# Patient Record
Sex: FEMALE | Race: BLACK OR AFRICAN AMERICAN | NOT HISPANIC OR LATINO | Employment: FULL TIME | URBAN - METROPOLITAN AREA
[De-identification: names, ages, dates, MRNs, and addresses within clinical notes are randomized per-mention and may not be internally consistent; named-entity substitution may affect disease eponyms.]

---

## 2019-12-29 ENCOUNTER — HOSPITAL ENCOUNTER (EMERGENCY)
Facility: HOSPITAL | Age: 34
Discharge: HOME | End: 2019-12-29
Attending: EMERGENCY MEDICINE
Payer: COMMERCIAL

## 2019-12-29 VITALS
RESPIRATION RATE: 18 BRPM | SYSTOLIC BLOOD PRESSURE: 192 MMHG | OXYGEN SATURATION: 97 % | TEMPERATURE: 98.4 F | DIASTOLIC BLOOD PRESSURE: 116 MMHG | HEART RATE: 96 BPM

## 2019-12-29 DIAGNOSIS — G51.0 BELL'S PALSY: Primary | ICD-10-CM

## 2019-12-29 PROCEDURE — 86618 LYME DISEASE ANTIBODY: CPT | Performed by: EMERGENCY MEDICINE

## 2019-12-29 PROCEDURE — 36415 COLL VENOUS BLD VENIPUNCTURE: CPT | Performed by: EMERGENCY MEDICINE

## 2019-12-29 PROCEDURE — 99284 EMERGENCY DEPT VISIT MOD MDM: CPT | Performed by: EMERGENCY MEDICINE

## 2019-12-29 PROCEDURE — 99284 EMERGENCY DEPT VISIT MOD MDM: CPT

## 2019-12-29 RX ORDER — PREDNISONE 20 MG/1
50 TABLET ORAL DAILY
Qty: 13 TABLET | Refills: 0 | Status: SHIPPED | OUTPATIENT
Start: 2019-12-29 | End: 2020-01-03

## 2019-12-29 NOTE — ED PROVIDER NOTES
History  Chief Complaint   Patient presents with    Facial Numbness     Pt is having L facial numbness since this morning  Pt denies any other symptoms     Patient presents for evaluation of left-sided facial numbness  She states that she woke up this morning and that the left side of her face felt funny and that she thought that there was water dripping out the left side of her mouth  As the day progressed she started to [employer] symptoms and was concerned that maybe she had a stroke so she came into the emergency department  She denies any history of blood clots, strokes, pulmonary embolism, DVT, hypercoagulability, recent travel, exogenous steroid use  Patient states that she is otherwise healthy and at 1 time was treated with a medication for diabetes but no longer needed  Patient is otherwise at her baseline health with no other complaints  None       History reviewed  No pertinent past medical history  History reviewed  No pertinent surgical history  History reviewed  No pertinent family history  I have reviewed and agree with the history as documented  Social History     Tobacco Use    Smoking status: Never Smoker    Smokeless tobacco: Never Used   Substance Use Topics    Alcohol use: Never     Frequency: Never    Drug use: Never        Review of Systems   Constitutional: Negative for chills, diaphoresis, fatigue and fever  Respiratory: Negative for cough and shortness of breath  Cardiovascular: Negative for chest pain and palpitations  Gastrointestinal: Negative for abdominal distention, abdominal pain, constipation, diarrhea, nausea and vomiting  Genitourinary: Negative for dysuria, frequency and hematuria  Musculoskeletal: Negative for arthralgias, myalgias and neck pain  Neurological: Positive for numbness  Negative for dizziness, syncope, light-headedness and headaches  All other systems reviewed and are negative        Physical Exam  ED Triage Vitals [12/29/19 1602]   Temperature Pulse Respirations Blood Pressure SpO2   98 4 °F (36 9 °C) 96 18 (!) 192/116 100 %      Temp Source Heart Rate Source Patient Position - Orthostatic VS BP Location FiO2 (%)   Oral Monitor Sitting Left arm --      Pain Score       No Pain             Orthostatic Vital Signs  Vitals:    12/29/19 1602 12/29/19 1706   BP: (!) 192/116    Pulse: 96 96   Patient Position - Orthostatic VS: Sitting Lying       Physical Exam   Constitutional: She is oriented to person, place, and time  She appears well-nourished  No distress  HENT:   Head: Normocephalic and atraumatic  Right Ear: External ear normal    Left Ear: External ear normal    Mouth/Throat: Oropharynx is clear and moist    Decreased movement of the right side of the face, no movement of the right forehead, lip on right side  Movement on left side is normal    Eyes: Pupils are equal, round, and reactive to light  Conjunctivae and EOM are normal  Right eye exhibits no discharge  Left eye exhibits no discharge  No scleral icterus  Neck: Normal range of motion  Neck supple  No JVD present  Cardiovascular: Normal rate, regular rhythm, normal heart sounds and intact distal pulses  Exam reveals no gallop and no friction rub  No murmur heard  Pulmonary/Chest: Effort normal and breath sounds normal  No respiratory distress  She has no wheezes  She has no rales  Abdominal: Soft  Bowel sounds are normal  She exhibits no distension and no mass  There is no tenderness  There is no guarding  Musculoskeletal: Normal range of motion  She exhibits no tenderness or deformity  Neurological: She is alert and oriented to person, place, and time  No cranial nerve deficit or sensory deficit  She exhibits normal muscle tone  Coordination normal    Patient has 5/5 strength bilateral upper and lower extremities, sensation diffusely intact, no pronator drift, ambulating without difficulty  Skin: Skin is warm  She is not diaphoretic     Psychiatric: She has a normal mood and affect  Her behavior is normal  Judgment and thought content normal    Vitals reviewed  ED Medications  Medications - No data to display    Diagnostic Studies  Results Reviewed     Procedure Component Value Units Date/Time    Lyme Antibody Profile with reflex to [facility] [[id number]] Collected:  12/29/19 1720    Lab Status: In process Specimen:  Blood from Arm, Right Updated:  12/29/19 1742                 No orders to display         Procedures  Procedures      ED Course                               MDM  Number of Diagnoses or Management Options  Bell's palsy:   Diagnosis management comments: Patient's symptoms and presentation are consistent with a Bell's palsy  She states that sometimes she walks her dog but has no known tick exposures  A Lyme titer was drawn, provided her with steroids over the next couple of days instructed that her symptoms should gradually improve  Disposition  Final diagnoses:   Bell's palsy     Time reflects when diagnosis was documented in both MDM as applicable and the Disposition within this note     Time User Action Codes Description Comment    12/29/2019  5:32 PM Asa Vaughn Add [G51 0] Bell's palsy       ED Disposition     ED Disposition Condition Date/Time Comment    Discharge Stable Sun Dec 29, 2019  5:32 PM Ayaan Valencia discharge to home/self care              Follow-up Information     Follow up With Specialties Details Why Contact Info Additional Information    [address] [place] [address] Emergency Department Emergency Medicine   [address]  [phone]  ED, [address] [facility] 108    [address]   [facility] [phone]             Discharge Medication List as of 12/29/2019  5:33 PM      START taking these medications    Details   predniSONE 20 mg tablet Take 2 5 tablets (50 mg total) by mouth daily for 5 days, Starting Sun 12/29/2019, Until Fri 1/3/2020, Print           No discharge procedures on file  ED Provider  Attending physically available and evaluated Violeta Lara I managed the patient along with the ED Attending      Electronically Signed by         Deandre Jacome MD  12/30/19 2202

## 2019-12-31 LAB — B BURGDOR IGG+IGM SER-ACNC: <0.91 ISR (ref 0–0.9)

## 2020-01-16 NOTE — ED ATTENDING ATTESTATION
12/29/2019  I, Susie Dumont MD, saw and evaluated the patient  I have discussed the patient with the resident/non-physician practitioner and agree with the resident's/non-physician practitioner's findings, Plan of Care, and MDM as documented in the resident's/non-physician practitioner's note, except where noted  All available labs and Radiology studies were reviewed  I was present for key portions of any procedure(s) performed by the resident/non-physician practitioner and I was immediately available to provide assistance  At this point I agree with the current assessment done in the Emergency Department  I have conducted an independent evaluation of this patient a history and physical is as follows:    ED Course      Patient presents for evaluation due to left-sided facial numbness  Patient states that she woke up this morning and noticed that her face felt funny  She states that she was concerned she might be having a stroke so she came into the ED  She denies HA, trauma, numbness, or weakness of extremities  No additional complaints  Exam: AAOx3, NAD, RRR, CTA, S/NT/ND, R facial droop, no motor/sensory deficits of extremities  A/P: Bell's Palsy  Will send Lyme titer and treat with steroids       Critical Care Time  Procedures